# Patient Record
Sex: FEMALE | Race: WHITE | Employment: FULL TIME | URBAN - METROPOLITAN AREA
[De-identification: names, ages, dates, MRNs, and addresses within clinical notes are randomized per-mention and may not be internally consistent; named-entity substitution may affect disease eponyms.]

---

## 2021-10-05 ENCOUNTER — HOSPITAL ENCOUNTER (EMERGENCY)
Age: 26
Discharge: HOME | End: 2021-10-05
Payer: COMMERCIAL

## 2021-10-05 VITALS
RESPIRATION RATE: 16 BRPM | SYSTOLIC BLOOD PRESSURE: 142 MMHG | HEART RATE: 60 BPM | DIASTOLIC BLOOD PRESSURE: 99 MMHG | OXYGEN SATURATION: 99 % | WEIGHT: 147 LBS | TEMPERATURE: 98.6 F | HEIGHT: 61 IN | BODY MASS INDEX: 27.75 KG/M2

## 2021-10-05 DIAGNOSIS — Z77.21 EXPOSURE TO BLOOD-BORNE PATHOGEN: Primary | ICD-10-CM

## 2021-10-05 PROCEDURE — 99283 EMERGENCY DEPT VISIT LOW MDM: CPT

## 2021-10-05 ASSESSMENT — PAIN SCALES - GENERAL: PAINLEVEL_OUTOF10: 0

## 2021-10-05 NOTE — ED PROVIDER NOTES
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 needs to be tested for Hep B. )      SHARED SERVICE VISIT  Evaluated by JODI. My supervising physician was available for consultation. HISTORY OF PRESENT ILLNESS  Sharmaine Chavarria is a 31 y.o. female who presents to the ED complaining of exposure to bodily fluid. 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 Patient denied any knowledge of HIV or hepatitis status of the patient. There was a small breakage in the skin. Patient states her hepatitis vaccination is up-to-date. Patient has a severe allergy to Tdap and is unable to get it. No other complaints, modifying factors or associated symptoms. Nursing notes reviewed. History reviewed. No pertinent past medical history. Past Surgical History:   Procedure Laterality Date     SECTION       History reviewed. No pertinent family history.   Social History     Socioeconomic History    Marital status: Single     Spouse name: Not on file    Number of children: Not on file    Years of education: Not on file    Highest education level: Not on file   Occupational History    Not on file   Tobacco Use    Smoking status: Current Every Day Smoker     Packs/day: 0.25     Years: 0.10     Pack years: 0.02     Types: Cigarettes    Smokeless tobacco: Never Used   Substance and Sexual Activity    Alcohol use: Yes     Comment: rare    Drug use: No    Sexual activity: Yes     Partners: Female   Other Topics Concern    Not on file   Social History Narrative    Not on file     Social Determinants of Health     Financial Resource Strain:     Difficulty of Paying Living Expenses:    Food Insecurity:     Worried About Running Out of Food in the Last Year:     [address] in the Last Year:    Transportation Needs:     Lack of Transportation (Medical):  Lack of Transportation (Non-Medical):    Physical Activity:     Days of Exercise per Week:     Minutes of Exercise per Session:    Stress:     Feeling of Stress :    Social Connections:     Frequency of Communication with Friends and Family:     Frequency of Social Gatherings with Friends and Family:     Attends [employer]oration[employer] Services:     Active Member of Clubs or Organizations:     Attends Club or Organization Meetings:     Marital Status:    Intimate Partner Violence:     Fear of Current or Ex-Partner:     Emotionally Abused:     Physically Abused:     Sexually Abused:      No current facility-administered medications for this encounter. Current Outpatient Medications   Medication Sig Dispense Refill    dicyclomine (BENTYL) 10 MG capsule Take 2 capsules by mouth three times daily for 10 doses 20 capsule 0    acetaminophen-codeine (TYLENOL/CODEINE #3) 300-30 MG per tablet Take 1 tablet by mouth 4 times daily as needed 15 tablet 0     No Known Allergies    REVIEW OF SYSTEMS  7 systems reviewed, pertinent positives per HPI otherwise noted to be negative    PHYSICAL EXAM  BP (!) 142/99   Pulse 60   Temp 98.6 °F (37 °C) (Oral)   Resp 16   Ht 5' 1\" (1.549 m)   Wt 147 lb (66.7 kg)   LMP 09/30/2021   SpO2 99%   BMI 27.78 kg/m²   GENERAL APPEARANCE: Awake and alert. Cooperative. HEAD: Normocephalic. Atraumatic. EYES: PERRL. EXTREMITIES: No peripheral edema. Moves all extremities equally. SKIN: Small 1 mm area of broken skin on the mid right forearm. NEUROLOGICAL: Alert and oriented. No gross facial drooping. PSYCHIATRIC: Normal mood and affect.     RADIOLOGY  [unfilled]    LABS  Labs Reviewed - No data to display    PROCEDURES  Unless otherwise noted below, none  Procedures    MDM  [age] female presents to emergency department for evaluation of body fluid exposure when she was struck with a sharp object while working as a dental assistant. Unknown status of the patient. Patient will be tested for hepatitis B C as well as HIV. Patient is up-to-date on hepatitis B vaccination. Discussed prophylactic treatment versus observation and waiting for test results. Patient elected to wait until the results of her hepatitis and HIV are returned prior to treatment. I do believe this is reasonable. Tetanus cannot be updated due to a reported allergy. Patient recommended return the emergency department or primary care if her test results would return is positive. DISPOSITION  Patient was discharged to home in good condition. CLINICAL IMPRESSION  1.  Exposure to blood-borne pathogen           Crys Martínez PA-C  10/05/21 1936

## 2021-10-05 NOTE — ED NOTES
Discharge paperwork given to and reviewed with pt. Pt verbalized understanding and all questions answered. Pt encouraged to return if having worsening symptoms or new symptoms discussed in discharge paperwork. Pt to follow up with PCP   Pt in NAD, RR even and unlabored.  Pt off unit ambulatory     Andrea Savage RN  10/05/21 0025